# Patient Record
Sex: MALE | Race: WHITE | NOT HISPANIC OR LATINO | RURAL
[De-identification: names, ages, dates, MRNs, and addresses within clinical notes are randomized per-mention and may not be internally consistent; named-entity substitution may affect disease eponyms.]

---

## 2024-01-03 ENCOUNTER — OFFICE VISIT (OUTPATIENT)
Dept: RURAL CLINIC 2 | Facility: CLINIC | Age: 49
End: 2024-01-03
Payer: COMMERCIAL

## 2024-01-03 ENCOUNTER — DASHBOARD ENCOUNTERS (OUTPATIENT)
Age: 49
End: 2024-01-03

## 2024-01-03 ENCOUNTER — LAB OUTSIDE AN ENCOUNTER (OUTPATIENT)
Dept: RURAL CLINIC 2 | Facility: CLINIC | Age: 49
End: 2024-01-03

## 2024-01-03 VITALS
BODY MASS INDEX: 27 KG/M2 | HEART RATE: 57 BPM | WEIGHT: 172 LBS | TEMPERATURE: 97.3 F | DIASTOLIC BLOOD PRESSURE: 92 MMHG | HEIGHT: 67 IN | SYSTOLIC BLOOD PRESSURE: 124 MMHG

## 2024-01-03 DIAGNOSIS — R19.4 CHANGE IN BOWEL HABITS: ICD-10-CM

## 2024-01-03 DIAGNOSIS — K57.92 DIVERTICULITIS: ICD-10-CM

## 2024-01-03 DIAGNOSIS — R10.84 ABDOMINAL CRAMPING, GENERALIZED: ICD-10-CM

## 2024-01-03 DIAGNOSIS — Z86.010 HISTORY OF COLON POLYPS: ICD-10-CM

## 2024-01-03 PROBLEM — 71419002: Status: ACTIVE | Noted: 2024-01-03

## 2024-01-03 PROBLEM — 285388000: Status: ACTIVE | Noted: 2024-01-03

## 2024-01-03 PROBLEM — 442182001: Status: ACTIVE | Noted: 2024-01-03

## 2024-01-03 PROBLEM — 428283002: Status: ACTIVE | Noted: 2024-01-03

## 2024-01-03 PROBLEM — 307496006: Status: ACTIVE | Noted: 2024-01-03

## 2024-01-03 PROBLEM — 88111009: Status: ACTIVE | Noted: 2024-01-03

## 2024-01-03 PROBLEM — 16331000: Status: ACTIVE | Noted: 2024-01-03

## 2024-01-03 PROCEDURE — 99204 OFFICE O/P NEW MOD 45 MIN: CPT | Performed by: INTERNAL MEDICINE

## 2024-01-03 PROCEDURE — 99244 OFF/OP CNSLTJ NEW/EST MOD 40: CPT | Performed by: INTERNAL MEDICINE

## 2024-01-03 RX ORDER — CIPROFLOXACIN HYDROCHLORIDE 500 MG/1
TABLET, FILM COATED ORAL
Qty: 20 TABLET | Status: ACTIVE | COMMUNITY

## 2024-01-03 RX ORDER — ONDANSETRON HYDROCHLORIDE 4 MG/1
TABLET, FILM COATED ORAL
Qty: 20 TABLET | Status: ACTIVE | COMMUNITY

## 2024-01-03 RX ORDER — METRONIDAZOLE 500 MG/1
TABLET ORAL
Qty: 30 TABLET | Status: ACTIVE | COMMUNITY

## 2024-01-03 RX ORDER — HYDROCODONE BITARTRATE AND ACETAMINOPHEN 5; 325 MG/1; MG/1
TABLET ORAL
Qty: 12 TABLET | Status: ACTIVE | COMMUNITY

## 2024-01-03 RX ORDER — SODIUM PICOSULFATE, MAGNESIUM OXIDE, AND ANHYDROUS CITRIC ACID 12; 3.5; 1 G/175ML; G/175ML; MG/175ML
175 ML THE FIRST DOSE THE EVENING BEFORE AND SECOND DOSE THE MORNING OF COLONOSCOPY LIQUID ORAL ONCE A DAY
Qty: 350 | Refills: 0 | OUTPATIENT
Start: 2024-01-03 | End: 2024-01-05

## 2024-01-03 NOTE — HPI-ZZZTODAY'S VISIT
The patient is a 48-year-old gentleman who presents on referral from Dr. Shaquille Scruggs for thickened distal esophagus on CT.  A copy of this document to be sent to the referring provider.  The patient underwent a CT scan in August for right upper quadrant pain revealing thickened distal esophagus.  He reports rare heartburn and denies GERD or dysphasia and right  upper quadrant pain has since resolved. He was recently seen at [facility] emergency room on December 30 for right lower quadrant abdominal pain with associated nausea, vomiting and diarrhea,  onset of symptoms x 48 hours.  CT scan of the abdomen and pelvis revealed left-sided diverticulosis with some stranding around the sigmoid colon and wall thickening suggestive of diverticulitis.  Routine lab work to include CBC, CMP and urinalysis were unremarkable.  He was discharged home on antibiotic therapy with Cipro and Flagyl. He has had  no further nausea, vomiting or diarrhea since Saturday and has had a normal bowel movement for the past 2 days.  He is currently tolerating a bland diet.  He reports continued right lower quadrant abdominal pain, mild in severity, occurring intermittently. He underwent a colonoscopy 6 years ago status post diverticulitis and recalls findings of diverticulosis and colon polyps.

## 2024-03-29 ENCOUNTER — COL/EGD (OUTPATIENT)
Dept: RURAL MEDICAL CENTER 4 | Facility: MEDICAL CENTER | Age: 49
End: 2024-03-29

## 2024-03-29 RX ORDER — ONDANSETRON HYDROCHLORIDE 4 MG/1
TABLET, FILM COATED ORAL
Qty: 20 TABLET | Status: ACTIVE | COMMUNITY

## 2024-03-29 RX ORDER — FAMOTIDINE 40 MG/1
1 TABLET TABLET, FILM COATED ORAL ONCE A DAY
Qty: 90 | Refills: 3 | OUTPATIENT
Start: 2024-03-29

## 2024-03-29 RX ORDER — METRONIDAZOLE 500 MG/1
TABLET ORAL
Qty: 30 TABLET | Status: ACTIVE | COMMUNITY

## 2024-03-29 RX ORDER — CIPROFLOXACIN HYDROCHLORIDE 500 MG/1
TABLET, FILM COATED ORAL
Qty: 20 TABLET | Status: ACTIVE | COMMUNITY

## 2024-03-29 RX ORDER — HYDROCODONE BITARTRATE AND ACETAMINOPHEN 5; 325 MG/1; MG/1
TABLET ORAL
Qty: 12 TABLET | Status: ACTIVE | COMMUNITY